# Patient Record
Sex: MALE | URBAN - METROPOLITAN AREA
[De-identification: names, ages, dates, MRNs, and addresses within clinical notes are randomized per-mention and may not be internally consistent; named-entity substitution may affect disease eponyms.]

---

## 2024-05-08 ENCOUNTER — APPOINTMENT (RX ONLY)
Dept: URBAN - METROPOLITAN AREA CLINIC 159 | Facility: CLINIC | Age: 69
Setting detail: DERMATOLOGY
End: 2024-05-08

## 2024-05-08 DIAGNOSIS — L81.4 OTHER MELANIN HYPERPIGMENTATION: ICD-10-CM

## 2024-05-08 DIAGNOSIS — L82.0 INFLAMED SEBORRHEIC KERATOSIS: ICD-10-CM

## 2024-05-08 DIAGNOSIS — D18.0 HEMANGIOMA: ICD-10-CM

## 2024-05-08 DIAGNOSIS — L82.1 OTHER SEBORRHEIC KERATOSIS: ICD-10-CM

## 2024-05-08 DIAGNOSIS — D485 NEOPLASM OF UNCERTAIN BEHAVIOR OF SKIN: ICD-10-CM

## 2024-05-08 DIAGNOSIS — D22 MELANOCYTIC NEVI: ICD-10-CM

## 2024-05-08 PROBLEM — D22.5 MELANOCYTIC NEVI OF TRUNK: Status: ACTIVE | Noted: 2024-05-08

## 2024-05-08 PROBLEM — D48.5 NEOPLASM OF UNCERTAIN BEHAVIOR OF SKIN: Status: ACTIVE | Noted: 2024-05-08

## 2024-05-08 PROBLEM — D18.01 HEMANGIOMA OF SKIN AND SUBCUTANEOUS TISSUE: Status: ACTIVE | Noted: 2024-05-08

## 2024-05-08 PROCEDURE — ? ADDITIONAL NOTES

## 2024-05-08 PROCEDURE — 17110 DESTRUCTION B9 LES UP TO 14: CPT

## 2024-05-08 PROCEDURE — ? LIQUID NITROGEN

## 2024-05-08 PROCEDURE — ? COUNSELING

## 2024-05-08 PROCEDURE — 99203 OFFICE O/P NEW LOW 30 MIN: CPT | Mod: 25

## 2024-05-08 PROCEDURE — ? PHOTO-DOCUMENTATION

## 2024-05-08 ASSESSMENT — LOCATION ZONE DERM
LOCATION ZONE: ARM
LOCATION ZONE: AXILLAE
LOCATION ZONE: TRUNK

## 2024-05-08 ASSESSMENT — LOCATION DETAILED DESCRIPTION DERM
LOCATION DETAILED: RIGHT ANTERIOR MEDIAL PROXIMAL UPPER ARM
LOCATION DETAILED: RIGHT MEDIAL UPPER BACK
LOCATION DETAILED: RIGHT AXILLARY VAULT
LOCATION DETAILED: INFERIOR THORACIC SPINE
LOCATION DETAILED: RIGHT RIB CAGE
LOCATION DETAILED: LEFT MEDIAL UPPER BACK

## 2024-05-08 ASSESSMENT — LOCATION SIMPLE DESCRIPTION DERM
LOCATION SIMPLE: ABDOMEN
LOCATION SIMPLE: RIGHT UPPER ARM
LOCATION SIMPLE: RIGHT UPPER BACK
LOCATION SIMPLE: LEFT UPPER BACK
LOCATION SIMPLE: UPPER BACK
LOCATION SIMPLE: RIGHT AXILLARY VAULT

## 2024-10-15 ENCOUNTER — APPOINTMENT (RX ONLY)
Dept: URBAN - METROPOLITAN AREA CLINIC 159 | Facility: CLINIC | Age: 69
Setting detail: DERMATOLOGY
End: 2024-10-15

## 2024-10-15 DIAGNOSIS — L81.4 OTHER MELANIN HYPERPIGMENTATION: ICD-10-CM

## 2024-10-15 DIAGNOSIS — L82.1 OTHER SEBORRHEIC KERATOSIS: ICD-10-CM

## 2024-10-15 DIAGNOSIS — D485 NEOPLASM OF UNCERTAIN BEHAVIOR OF SKIN: ICD-10-CM

## 2024-10-15 PROBLEM — D48.5 NEOPLASM OF UNCERTAIN BEHAVIOR OF SKIN: Status: ACTIVE | Noted: 2024-10-15

## 2024-10-15 PROCEDURE — ? CONSULTATION FOR EXCISION

## 2024-10-15 PROCEDURE — 99213 OFFICE O/P EST LOW 20 MIN: CPT

## 2024-10-15 PROCEDURE — ? COUNSELING

## 2024-10-15 ASSESSMENT — LOCATION DETAILED DESCRIPTION DERM
LOCATION DETAILED: SUPERIOR THORACIC SPINE
LOCATION DETAILED: RIGHT MEDIAL UPPER BACK
LOCATION DETAILED: INFERIOR THORACIC SPINE

## 2024-10-15 ASSESSMENT — LOCATION ZONE DERM: LOCATION ZONE: TRUNK

## 2024-10-15 ASSESSMENT — LOCATION SIMPLE DESCRIPTION DERM
LOCATION SIMPLE: RIGHT UPPER BACK
LOCATION SIMPLE: UPPER BACK

## 2024-10-15 NOTE — PROCEDURE: MIPS QUALITY
Name And Contact Information For Health Care Proxy: Arti Contreras wife [phone]
Quality 47: Advance Care Plan: Advance Care Planning discussed and documented; advance care plan or surrogate decision maker documented in the medical record.
Detail Level: Detailed
Quality 226: Preventive Care And Screening: Tobacco Use: Screening And Cessation Intervention: Patient screened for tobacco use and is an ex/non-smoker

## 2024-10-22 ENCOUNTER — APPOINTMENT (RX ONLY)
Dept: URBAN - METROPOLITAN AREA CLINIC 159 | Facility: CLINIC | Age: 69
Setting detail: DERMATOLOGY
End: 2024-10-22

## 2025-07-31 ENCOUNTER — APPOINTMENT (OUTPATIENT)
Dept: URBAN - METROPOLITAN AREA CLINIC 159 | Facility: CLINIC | Age: 70
Setting detail: DERMATOLOGY
End: 2025-07-31

## 2025-07-31 DIAGNOSIS — L57.0 ACTINIC KERATOSIS: ICD-10-CM

## 2025-07-31 DIAGNOSIS — D69.2 OTHER NONTHROMBOCYTOPENIC PURPURA: ICD-10-CM

## 2025-07-31 DIAGNOSIS — Z12.83 ENCOUNTER FOR SCREENING FOR MALIGNANT NEOPLASM OF SKIN: ICD-10-CM

## 2025-07-31 DIAGNOSIS — L72.8 OTHER FOLLICULAR CYSTS OF THE SKIN AND SUBCUTANEOUS TISSUE: ICD-10-CM

## 2025-07-31 DIAGNOSIS — D22 MELANOCYTIC NEVI: ICD-10-CM

## 2025-07-31 DIAGNOSIS — D18.0 HEMANGIOMA: ICD-10-CM

## 2025-07-31 DIAGNOSIS — D485 NEOPLASM OF UNCERTAIN BEHAVIOR OF SKIN: ICD-10-CM

## 2025-07-31 DIAGNOSIS — L81.4 OTHER MELANIN HYPERPIGMENTATION: ICD-10-CM

## 2025-07-31 DIAGNOSIS — L82.1 OTHER SEBORRHEIC KERATOSIS: ICD-10-CM

## 2025-07-31 DIAGNOSIS — D49.2 NEOPLASM OF UNSPECIFIED BEHAVIOR OF BONE, SOFT TISSUE, AND SKIN: ICD-10-CM

## 2025-07-31 PROBLEM — D48.5 NEOPLASM OF UNCERTAIN BEHAVIOR OF SKIN: Status: ACTIVE | Noted: 2025-07-31

## 2025-07-31 PROBLEM — D18.01 HEMANGIOMA OF SKIN AND SUBCUTANEOUS TISSUE: Status: ACTIVE | Noted: 2025-07-31

## 2025-07-31 PROBLEM — D22.5 MELANOCYTIC NEVI OF TRUNK: Status: ACTIVE | Noted: 2025-07-31

## 2025-07-31 PROBLEM — D40.8 NEOPLASM OF UNCERTAIN BEHAVIOR OF OTHER SPECIFIED MALE GENITAL ORGANS: Status: ACTIVE | Noted: 2025-07-31

## 2025-07-31 PROCEDURE — ? COUNSELING

## 2025-07-31 PROCEDURE — ?

## 2025-07-31 PROCEDURE — ?: Mod: 59

## 2025-07-31 PROCEDURE — ?: Mod: 25

## 2025-07-31 PROCEDURE — ? BIOPSY BY SHAVE METHOD

## 2025-07-31 PROCEDURE — ? LIQUID NITROGEN

## 2025-07-31 PROCEDURE — ? SHAVE REMOVAL

## 2025-07-31 ASSESSMENT — LOCATION SIMPLE DESCRIPTION DERM
LOCATION SIMPLE: LEFT SHOULDER
LOCATION SIMPLE: ABDOMEN
LOCATION SIMPLE: RIGHT FOREARM
LOCATION SIMPLE: PENIS
LOCATION SIMPLE: LEFT ZYGOMA
LOCATION SIMPLE: RIGHT SHOULDER
LOCATION SIMPLE: RIGHT UPPER BACK
LOCATION SIMPLE: UPPER BACK
LOCATION SIMPLE: LEFT FOREARM
LOCATION SIMPLE: LEFT UPPER BACK
LOCATION SIMPLE: LEFT CHEEK

## 2025-07-31 ASSESSMENT — LOCATION ZONE DERM
LOCATION ZONE: FACE
LOCATION ZONE: PENIS
LOCATION ZONE: TRUNK
LOCATION ZONE: ARM

## 2025-07-31 ASSESSMENT — LOCATION DETAILED DESCRIPTION DERM
LOCATION DETAILED: LEFT PROXIMAL DORSAL FOREARM
LOCATION DETAILED: RIGHT ANTERIOR SHOULDER
LOCATION DETAILED: LEFT ANTERIOR SHOULDER
LOCATION DETAILED: RIGHT PROXIMAL DORSAL FOREARM
LOCATION DETAILED: LEFT DORSAL SHAFT OF PENIS
LOCATION DETAILED: LEFT MEDIAL ZYGOMA
LOCATION DETAILED: RIGHT MEDIAL UPPER BACK
LOCATION DETAILED: PERIUMBILICAL SKIN
LOCATION DETAILED: LEFT MEDIAL UPPER BACK
LOCATION DETAILED: INFERIOR THORACIC SPINE
LOCATION DETAILED: LEFT MID PREAURICULAR CHEEK
LOCATION DETAILED: EPIGASTRIC SKIN

## 2025-07-31 NOTE — PROCEDURE: COUNSELING
Detail Level: Zone
Detail Level: Generalized
Detail Level: Detailed
Detail Level: Simple
Patient Specific Counseling (Will Not Stick From Patient To Patient): =======================07/31/25\\nPt has been called to schedule excision upstair but has not found the time for removal.\\nPt will call when he's ready to schedule.
Patient Specific Counseling (Will Not Stick From Patient To Patient): =======================07/31/25\\nRecommended derMend for the arms

## 2025-07-31 NOTE — PROCEDURE: SHAVE REMOVAL
Medical Necessity Information: It is in your best interest to select a reason for this procedure from the list below. All of these items fulfill various CMS LCD requirements except the new and changing color options.
Medical Necessity Clause: This procedure was medically necessary because the lesion that was treated was:
Lab: [id number]
Lab Facility: [id number]
Detail Level: Detailed
Was A Bandage Applied: Yes
Size Of Lesion In Cm (Required): 1.3
Depth Of Shave: dermis
Biopsy Method: Dermablade
Anesthesia Type: 1% lidocaine with epinephrine
Hemostasis: Drysol
Wound Care: Petrolatum
Render Path Notes In Note?: No
Consent was obtained from the patient. The risks and benefits to therapy were discussed in detail. Specifically, the risks of infection, scarring, bleeding, prolonged wound healing, incomplete removal, allergy to anesthesia, nerve injury and recurrence were addressed. Prior to the procedure, the treatment site was clearly identified and confirmed by the patient. All components of Universal Protocol/PAUSE Rule completed.
Post-Care Instructions: I reviewed with the patient in detail post-care instructions. Patient is to keep the biopsy site dry overnight, and then apply bacitracin twice daily until healed. Patient may apply hydrogen peroxide soaks to remove any crusting.
Notification Instructions: Patient will be notified of pathology results. However, patient instructed to call the office if not contacted within 2 weeks.
Billing Type: Third-Party Bill
Size Of Lesion In Cm (Required): 1.2

## 2025-07-31 NOTE — PROCEDURE: MIPS QUALITY
Name And Contact Information For Health Care Proxy: Arti Contreras wife [phone]
Quality 47: Advance Care Plan: Advance Care Planning discussed and documented; advance care plan or surrogate decision maker documented in the medical record.
Detail Level: Detailed
Quality 226: Preventive Care And Screening: Tobacco Use: Screening And Cessation Intervention: Patient screened for tobacco use and is an ex/non-smoker

## 2025-07-31 NOTE — PROCEDURE: LIQUID NITROGEN
Render Note In Bullet Format When Appropriate: No
Application Tool (Optional): Cry-AC
Duration Of Freeze Thaw-Cycle (Seconds): 0
Post-Care Instructions: I reviewed with the patient in detail post-care instructions. Patient is to wear sunprotection, and avoid picking at any of the treated lesions. Pt may apply Vaseline to crusted or scabbing areas.
Detail Level: Detailed
Number Of Freeze-Thaw Cycles: 2 freeze-thaw cycles
Show Applicator Variable?: Yes
Consent: The patient's consent was obtained including but not limited to risks of crusting, scabbing, blistering, scarring, darker or lighter pigmentary change, recurrence, incomplete removal and infection.